# Patient Record
Sex: FEMALE | URBAN - METROPOLITAN AREA
[De-identification: names, ages, dates, MRNs, and addresses within clinical notes are randomized per-mention and may not be internally consistent; named-entity substitution may affect disease eponyms.]

---

## 2023-07-25 ENCOUNTER — TELEPHONE (OUTPATIENT)
Dept: ORTHOPEDIC SURGERY | Facility: CLINIC | Age: 83
End: 2023-07-25

## 2023-07-25 NOTE — TELEPHONE ENCOUNTER
I left message for patient to call me directly so I can schedule her an appt with Dr Aragon this week if possible. The referral is for Dr Huy Dudley's office.  Please send patient to me.    Yasmany

## 2023-08-01 ENCOUNTER — TELEPHONE (OUTPATIENT)
Dept: ORTHOPEDIC SURGERY | Facility: CLINIC | Age: 83
End: 2023-08-01

## 2023-08-08 ENCOUNTER — TELEPHONE (OUTPATIENT)
Dept: ORTHOPEDIC SURGERY | Facility: CLINIC | Age: 83
End: 2023-08-08

## 2023-08-08 NOTE — TELEPHONE ENCOUNTER
Attempted to reach Ms. Zamora again but unsuccessful.  Phone won't allow me to leave a voice message.  This is the 3rd attempt.  If Ms. Zamora should need assistance she can contact our office.  At this time unable to contact after 3 attempts.    Kim

## 2024-06-10 ENCOUNTER — TELEPHONE (OUTPATIENT)
Age: 84
End: 2024-06-10

## 2024-06-10 RX ORDER — METHOTREXATE 2.5 MG/1
25 TABLET ORAL WEEKLY
Qty: 40 TABLET | Refills: 3 | Status: SHIPPED | OUTPATIENT
Start: 2024-06-10

## 2024-06-10 RX ORDER — METHOTREXATE 2.5 MG/1
25 TABLET ORAL WEEKLY
COMMUNITY
End: 2024-06-10 | Stop reason: SDUPTHER

## 2024-07-24 PROBLEM — M81.0 OSTEOPOROSIS: Status: ACTIVE | Noted: 2024-07-24

## 2024-07-24 PROBLEM — M06.9 RHEUMATOID ARTHRITIS: Status: ACTIVE | Noted: 2024-07-24

## 2024-07-25 ENCOUNTER — LAB (OUTPATIENT)
Facility: HOSPITAL | Age: 84
End: 2024-07-25
Payer: MEDICARE

## 2024-07-25 ENCOUNTER — OFFICE VISIT (OUTPATIENT)
Age: 84
End: 2024-07-25
Payer: MEDICARE

## 2024-07-25 ENCOUNTER — TELEPHONE (OUTPATIENT)
Age: 84
End: 2024-07-25

## 2024-07-25 ENCOUNTER — APPOINTMENT (OUTPATIENT)
Facility: HOSPITAL | Age: 84
End: 2024-07-25
Payer: MEDICARE

## 2024-07-25 VITALS
HEIGHT: 64 IN | HEART RATE: 113 BPM | SYSTOLIC BLOOD PRESSURE: 120 MMHG | TEMPERATURE: 98.5 F | DIASTOLIC BLOOD PRESSURE: 82 MMHG | WEIGHT: 103 LBS | BODY MASS INDEX: 17.58 KG/M2

## 2024-07-25 DIAGNOSIS — M81.0 AGE-RELATED OSTEOPOROSIS WITHOUT CURRENT PATHOLOGICAL FRACTURE: Chronic | ICD-10-CM

## 2024-07-25 DIAGNOSIS — M05.9 SEROPOSITIVE RHEUMATOID ARTHRITIS: Chronic | ICD-10-CM

## 2024-07-25 DIAGNOSIS — M05.9 SEROPOSITIVE RHEUMATOID ARTHRITIS: Primary | Chronic | ICD-10-CM

## 2024-07-25 DIAGNOSIS — Z79.899 HIGH RISK MEDICATION USE: Chronic | ICD-10-CM

## 2024-07-25 LAB
ALBUMIN SERPL-MCNC: 3.6 G/DL (ref 3.5–5.2)
ALBUMIN/GLOB SERPL: 1 G/DL
ALP SERPL-CCNC: 75 U/L (ref 39–117)
ALT SERPL W P-5'-P-CCNC: 11 U/L (ref 1–33)
ANION GAP SERPL CALCULATED.3IONS-SCNC: 7.5 MMOL/L (ref 5–15)
AST SERPL-CCNC: 23 U/L (ref 1–32)
BASOPHILS # BLD AUTO: 0.08 10*3/MM3 (ref 0–0.2)
BASOPHILS NFR BLD AUTO: 1.1 % (ref 0–1.5)
BILIRUB SERPL-MCNC: 0.6 MG/DL (ref 0–1.2)
BUN SERPL-MCNC: 15 MG/DL (ref 8–23)
BUN/CREAT SERPL: 15.8 (ref 7–25)
CALCIUM SPEC-SCNC: 9.6 MG/DL (ref 8.6–10.5)
CHLORIDE SERPL-SCNC: 104 MMOL/L (ref 98–107)
CO2 SERPL-SCNC: 29.5 MMOL/L (ref 22–29)
CREAT SERPL-MCNC: 0.95 MG/DL (ref 0.57–1)
CRP SERPL-MCNC: 0.63 MG/DL (ref 0–0.5)
DEPRECATED RDW RBC AUTO: 49.1 FL (ref 37–54)
EGFRCR SERPLBLD CKD-EPI 2021: 60.3 ML/MIN/1.73
EOSINOPHIL # BLD AUTO: 0.06 10*3/MM3 (ref 0–0.4)
EOSINOPHIL NFR BLD AUTO: 0.8 % (ref 0.3–6.2)
ERYTHROCYTE [DISTWIDTH] IN BLOOD BY AUTOMATED COUNT: 14.2 % (ref 12.3–15.4)
ERYTHROCYTE [SEDIMENTATION RATE] IN BLOOD: 31 MM/HR (ref 0–30)
GLOBULIN UR ELPH-MCNC: 3.5 GM/DL
GLUCOSE SERPL-MCNC: 83 MG/DL (ref 65–99)
HCT VFR BLD AUTO: 36.7 % (ref 34–46.6)
HGB BLD-MCNC: 12.5 G/DL (ref 12–15.9)
IMM GRANULOCYTES # BLD AUTO: 0.02 10*3/MM3 (ref 0–0.05)
IMM GRANULOCYTES NFR BLD AUTO: 0.3 % (ref 0–0.5)
LYMPHOCYTES # BLD AUTO: 1.49 10*3/MM3 (ref 0.7–3.1)
LYMPHOCYTES NFR BLD AUTO: 20.1 % (ref 19.6–45.3)
MCH RBC QN AUTO: 32.8 PG (ref 26.6–33)
MCHC RBC AUTO-ENTMCNC: 34.1 G/DL (ref 31.5–35.7)
MCV RBC AUTO: 96.3 FL (ref 79–97)
MONOCYTES # BLD AUTO: 0.73 10*3/MM3 (ref 0.1–0.9)
MONOCYTES NFR BLD AUTO: 9.8 % (ref 5–12)
NEUTROPHILS NFR BLD AUTO: 5.05 10*3/MM3 (ref 1.7–7)
NEUTROPHILS NFR BLD AUTO: 67.9 % (ref 42.7–76)
NRBC BLD AUTO-RTO: 0 /100 WBC (ref 0–0.2)
PLATELET # BLD AUTO: 366 10*3/MM3 (ref 140–450)
PMV BLD AUTO: 10.2 FL (ref 6–12)
POTASSIUM SERPL-SCNC: 4.1 MMOL/L (ref 3.5–5.2)
PROT SERPL-MCNC: 7.1 G/DL (ref 6–8.5)
RBC # BLD AUTO: 3.81 10*6/MM3 (ref 3.77–5.28)
SODIUM SERPL-SCNC: 141 MMOL/L (ref 136–145)
WBC NRBC COR # BLD AUTO: 7.43 10*3/MM3 (ref 3.4–10.8)

## 2024-07-25 PROCEDURE — 36415 COLL VENOUS BLD VENIPUNCTURE: CPT

## 2024-07-25 PROCEDURE — 80053 COMPREHEN METABOLIC PANEL: CPT

## 2024-07-25 PROCEDURE — 86140 C-REACTIVE PROTEIN: CPT

## 2024-07-25 PROCEDURE — 85652 RBC SED RATE AUTOMATED: CPT

## 2024-07-25 PROCEDURE — 85025 COMPLETE CBC W/AUTO DIFF WBC: CPT

## 2024-07-25 RX ORDER — FOLIC ACID 1 MG/1
1 TABLET ORAL DAILY
Qty: 90 TABLET | Refills: 3 | Status: SHIPPED | OUTPATIENT
Start: 2024-07-25

## 2024-07-25 RX ORDER — METHOTREXATE 2.5 MG/1
25 TABLET ORAL WEEKLY
Qty: 40 TABLET | Refills: 3 | Status: SHIPPED | OUTPATIENT
Start: 2024-07-25

## 2024-07-25 NOTE — ASSESSMENT & PLAN NOTE
MTX 25 mg PO once/week for RA  1. CBC and CMP every 8-12 weeks to monitor for medication toxicity.   2. Take folate supplements daily.  3. No recent serious infections.  4. Refill today

## 2024-07-25 NOTE — ASSESSMENT & PLAN NOTE
Diagnosed in 2011; On MTX since 2011; nodular; erosive disease   In the past she was on salsalate  1. Continue methotrexate and folate supplements   2. She is not on prednisone.   3. Monitor labs every 8-12 weeks for toxicity.   4. Prognosis is fair.   5. Her morning stiffness is < 1 hour/day.  6. Refill medicines  7. RTC 4 months

## 2024-07-25 NOTE — ASSESSMENT & PLAN NOTE
* Fosamax started 7/2014 - stopped due to long term use  * DXA 10/26/22: Right femoral neck -3.4 (-13.3% vs previous DEXA in 2020). Spine -2.7  1. Calcium and vitamin D supplements should be taken unless otherwise contraindicated.   2. DXA scan should be monitored approximately every 2 years. Due 10/2024.  3. Prolia was too expensive for her.   4. She got Reclast 10/8/2019, 10/16/20, 10/20/21, 12/6/22. This is given once/year. We will plan/schedule the next dosing. Risks and benefits reviewed  5. Check labs today

## 2024-07-25 NOTE — PROGRESS NOTES
Office Follow Up      Date: 07/25/2024   Patient Name: Paris Zamora  MRN: [id number]  YOB: 1940    Referring Physician: No ref. provider found     Chief Complaint   Patient presents with    Rheumatoid Arthritis     Follow up    Osteoporosis     Follow up       History of Present Illness: Paris Zamora is a 83 y.o. female who is here today for follow up.       She has nodular/erosive RA and is on methotrexate. She remains off of prednisone. She is tolerating her medications well. No recent infections. She does have rheumatoid nodules as well as chronic deformities related to her arthritis.     DXA scan (1/3/2018) consistent with osteoporosis.  She was previously treated with alendronate for osteoporosis. Prolia was started 2/2018 but later stopped due to cost. She got Reclast 10/8/2019, 10/16/20, 10/20/21, and 12/6/22. She has tolerated this well.    Today she rates her pain as 6/10 in severity. She has no significant morning stiffness. No red or hot joints. No swelling at this time. No muscle pain or weakness. No back or neck issues.     No rash. She notes hair loss. Her skin itches. No headaches or paresthesias. No lymphadenopathy. No abnormal bruising/bleeding. No  issues. She notes a loss of appetite. No sicca symptoms. No shortness of breath or chest pain.    Subjective     Review of Systems   Constitutional:  Positive for unexpected weight loss.   HENT:  Positive for hearing loss.    Eyes:  Positive for blurred vision.   Respiratory: Negative.     Cardiovascular: Negative.    Gastrointestinal: Negative.    Endocrine: Negative.    Genitourinary: Negative.    Musculoskeletal:  Positive for arthralgias.   Skin: Negative.    Allergic/Immunologic: Negative.    Neurological: Negative.    Hematological: Negative.    Psychiatric/Behavioral: Negative.  Positive for depressed mood.    All other systems reviewed and are negative.         Current Outpatient Medications:     Calcium  "Carbonate (CALCIUM 500 PO), Take 2 tablets by mouth Daily., Disp: , Rfl:     Cholecalciferol (Vitamin D3) 25 MCG (1000 UT) capsule, Take 1 capsule by mouth Daily., Disp: , Rfl:     folic acid (FOLVITE) 1 MG tablet, Take 1 tablet by mouth Daily., Disp: 90 tablet, Rfl: 3    methotrexate 2.5 MG tablet, Take 10 tablets by mouth 1 (One) Time Per Week., Disp: 40 tablet, Rfl: 3    Omega-3 Fatty Acids (FISH OIL PO), Take 1 capsule by mouth. EVERY 2 DAYS FOR 30 DAYS, Disp: , Rfl:     zoledronic acid (RECLAST) 5 MG/100ML solution infusion, Infuse 100 mL into a venous catheter 1 (One) Time. AS DIRECTED  ONCE ANNUALLY, Disp: , Rfl:     Allergies   Allergen Reactions    Naproxen Provider Review Needed    Prednisone Provider Review Needed       I have reviewed and updated the patient's chief complaint, history of present illness, review of systems, past medical history, surgical history, family history, social history, medications and allergy list as appropriate.     Objective      Vitals:    07/25/24 1341   BP: 120/82   BP Location: Left arm   Pulse: 113   Temp: 98.5 °F (36.9 °C)   Weight: 46.7 kg (103 lb)   Height: 162.6 cm (64\")   PainSc:   6     Body mass index is 17.68 kg/m².      Physical Exam     General: 83 year old  female, not in distress. She is frail in appearance. She walks unassisted with a shuffling gait.  Skin: No rash. She has rheumatoid nodules over each elbow. The left is larger than the right. She has nodules on her hands/fingers as well.  Head/Neck: NCAT, neck is supple, no salivary gland enlargement   Eyes: Conjunctiva and sclera are clear, no photophobia   ENMT: Hearing is globally reduced. No oral or nasal ulcers. She is very hoarse.  Lung exam: Clear throughout all lung fields. No wheezing. She becomes winded with speaking.  CV: Regular rate and rhythm. She has a III/VI NOE.   Peripheral vascular: No edema, no cyanosis.  Psych: Normal mood and affect. The patient is oriented to person, place, and " time   MSK: Chronic deformities of the hands and feet. With regards to her hands she has MCP subluxation with ulnar deviation. She has slight flexion contractures of  both elbows. There is fluid in the left olecranon bursa. She has knee crepitus. 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 No tenderness to palpation. None of the joints are warm to touch.   Lymph: No cervical lymphadenopathy      Procedures    Assessment / Plan      Assessment & Plan  Seropositive rheumatoid arthritis   Diagnosed in 2011; On MTX since 2011; nodular; erosive disease   In the past she was on salsalate  1. Continue methotrexate and folate supplements   2. She is not on prednisone.   3. Monitor labs every 8-12 weeks for toxicity.   4. Prognosis is fair.   5. Her morning stiffness is < 1 hour/day.  6. Refill medicines  7. RTC 4 months   High risk medication use  MTX 25 mg PO once/week for RA  1. CBC and CMP every 8-12 weeks to monitor for medication toxicity.   2. Take folate supplements daily.  3. No recent serious infections.  4. Refill today    Age-related osteoporosis without current pathological fracture  * Fosamax started 7/2014 - stopped due to long term use  * DXA 10/26/22: Right femoral neck -3.4 (-13.3% vs previous DEXA in 2020). Spine -2.7  1. Calcium and vitamin D supplements should be taken unless otherwise contraindicated.   2. DXA scan should be monitored approximately every 2 years. Due 10/2024.  3. Prolia was too expensive for her.   4. She got Reclast 10/8/2019, 10/16/20, 10/20/21, 12/6/22. This is given once/year. We will plan/schedule the next dosing. Risks and benefits reviewed  5. Check labs today        New Medications Ordered This Visit   Medications    methotrexate 2.5 MG tablet     Sig: Take 10 tablets by mouth 1 (One) Time Per Week.     Dispense:  40 tablet     Refill:  3    folic acid (FOLVITE) 1 MG tablet     Sig: Take 1 tablet by mouth Daily.      Dispense:  90 tablet     Refill:  3         Follow Up:   Return in about 4 months (around 11/25/2024).      Huy Dudley DO  [facility] [place]

## 2024-07-25 NOTE — TELEPHONE ENCOUNTER
----- Message from Huy Dudley sent at 7/25/2024  1:58 PM EDT -----  Regarding: Reclast  I am not sure she get Reclast in 2023. She does not seem sure either. Can you check on this and schedule if she is due ?

## 2024-08-01 NOTE — TELEPHONE ENCOUNTER
It looks like last Reclast was 12/6/22. I tried to call pt to see which [facility] location would be easiest for her to get set up for treatment at. I left a message on her/POA's line with my direct line requesting a call back.

## 2024-12-20 ENCOUNTER — OFFICE VISIT (OUTPATIENT)
Age: 84
End: 2024-12-20
Payer: MEDICARE

## 2024-12-20 ENCOUNTER — LAB (OUTPATIENT)
Facility: HOSPITAL | Age: 84
End: 2024-12-20
Payer: MEDICARE

## 2024-12-20 VITALS
BODY MASS INDEX: 18.1 KG/M2 | HEART RATE: 73 BPM | HEIGHT: 64 IN | DIASTOLIC BLOOD PRESSURE: 70 MMHG | WEIGHT: 106 LBS | TEMPERATURE: 98 F | SYSTOLIC BLOOD PRESSURE: 130 MMHG

## 2024-12-20 DIAGNOSIS — Z79.899 HIGH RISK MEDICATION USE: Chronic | ICD-10-CM

## 2024-12-20 DIAGNOSIS — M81.0 AGE-RELATED OSTEOPOROSIS WITHOUT CURRENT PATHOLOGICAL FRACTURE: Chronic | ICD-10-CM

## 2024-12-20 DIAGNOSIS — M05.9 SEROPOSITIVE RHEUMATOID ARTHRITIS: Chronic | ICD-10-CM

## 2024-12-20 DIAGNOSIS — M05.9 SEROPOSITIVE RHEUMATOID ARTHRITIS: Primary | Chronic | ICD-10-CM

## 2024-12-20 LAB
25(OH)D3 SERPL-MCNC: 74.1 NG/ML (ref 30–100)
ALBUMIN SERPL-MCNC: 3.4 G/DL (ref 3.5–5.2)
ALBUMIN/GLOB SERPL: 0.9 G/DL
ALP SERPL-CCNC: 73 U/L (ref 39–117)
ALT SERPL W P-5'-P-CCNC: 17 U/L (ref 1–33)
ANION GAP SERPL CALCULATED.3IONS-SCNC: 9 MMOL/L (ref 5–15)
AST SERPL-CCNC: 27 U/L (ref 1–32)
BASOPHILS # BLD AUTO: 0.12 10*3/MM3 (ref 0–0.2)
BASOPHILS NFR BLD AUTO: 1.3 % (ref 0–1.5)
BILIRUB SERPL-MCNC: 0.3 MG/DL (ref 0–1.2)
BUN SERPL-MCNC: 17 MG/DL (ref 8–23)
BUN/CREAT SERPL: 21.8 (ref 7–25)
CALCIUM SPEC-SCNC: 8.8 MG/DL (ref 8.6–10.5)
CHLORIDE SERPL-SCNC: 104 MMOL/L (ref 98–107)
CO2 SERPL-SCNC: 27 MMOL/L (ref 22–29)
CREAT SERPL-MCNC: 0.78 MG/DL (ref 0.57–1)
CRP SERPL-MCNC: 1.28 MG/DL (ref 0–0.5)
DEPRECATED RDW RBC AUTO: 46.4 FL (ref 37–54)
EGFRCR SERPLBLD CKD-EPI 2021: 75.9 ML/MIN/1.73
EOSINOPHIL # BLD AUTO: 0.04 10*3/MM3 (ref 0–0.4)
EOSINOPHIL NFR BLD AUTO: 0.4 % (ref 0.3–6.2)
ERYTHROCYTE [DISTWIDTH] IN BLOOD BY AUTOMATED COUNT: 13.5 % (ref 12.3–15.4)
ERYTHROCYTE [SEDIMENTATION RATE] IN BLOOD: 49 MM/HR (ref 0–30)
GLOBULIN UR ELPH-MCNC: 3.8 GM/DL
GLUCOSE SERPL-MCNC: 144 MG/DL (ref 65–99)
HCT VFR BLD AUTO: 37.2 % (ref 34–46.6)
HGB BLD-MCNC: 12.9 G/DL (ref 12–15.9)
IMM GRANULOCYTES # BLD AUTO: 0.02 10*3/MM3 (ref 0–0.05)
IMM GRANULOCYTES NFR BLD AUTO: 0.2 % (ref 0–0.5)
LYMPHOCYTES # BLD AUTO: 1.75 10*3/MM3 (ref 0.7–3.1)
LYMPHOCYTES NFR BLD AUTO: 18.4 % (ref 19.6–45.3)
MCH RBC QN AUTO: 33.2 PG (ref 26.6–33)
MCHC RBC AUTO-ENTMCNC: 34.7 G/DL (ref 31.5–35.7)
MCV RBC AUTO: 95.6 FL (ref 79–97)
MONOCYTES # BLD AUTO: 0.52 10*3/MM3 (ref 0.1–0.9)
MONOCYTES NFR BLD AUTO: 5.5 % (ref 5–12)
NEUTROPHILS NFR BLD AUTO: 7.07 10*3/MM3 (ref 1.7–7)
NEUTROPHILS NFR BLD AUTO: 74.2 % (ref 42.7–76)
NRBC BLD AUTO-RTO: 0 /100 WBC (ref 0–0.2)
PLATELET # BLD AUTO: 475 10*3/MM3 (ref 140–450)
PMV BLD AUTO: 10.3 FL (ref 6–12)
POTASSIUM SERPL-SCNC: 4.3 MMOL/L (ref 3.5–5.2)
PROT SERPL-MCNC: 7.2 G/DL (ref 6–8.5)
RBC # BLD AUTO: 3.89 10*6/MM3 (ref 3.77–5.28)
SODIUM SERPL-SCNC: 140 MMOL/L (ref 136–145)
WBC NRBC COR # BLD AUTO: 9.52 10*3/MM3 (ref 3.4–10.8)

## 2024-12-20 PROCEDURE — 85652 RBC SED RATE AUTOMATED: CPT

## 2024-12-20 PROCEDURE — 36415 COLL VENOUS BLD VENIPUNCTURE: CPT

## 2024-12-20 PROCEDURE — 82306 VITAMIN D 25 HYDROXY: CPT

## 2024-12-20 PROCEDURE — 99214 OFFICE O/P EST MOD 30 MIN: CPT | Performed by: INTERNAL MEDICINE

## 2024-12-20 PROCEDURE — 85025 COMPLETE CBC W/AUTO DIFF WBC: CPT

## 2024-12-20 PROCEDURE — 86140 C-REACTIVE PROTEIN: CPT

## 2024-12-20 PROCEDURE — 80053 COMPREHEN METABOLIC PANEL: CPT

## 2024-12-20 RX ORDER — FOLIC ACID 1 MG/1
1 TABLET ORAL DAILY
Qty: 90 TABLET | Refills: 3 | Status: SHIPPED | OUTPATIENT
Start: 2024-12-20

## 2024-12-20 RX ORDER — METHOTREXATE 2.5 MG/1
25 TABLET ORAL WEEKLY
Qty: 40 TABLET | Refills: 3 | Status: SHIPPED | OUTPATIENT
Start: 2024-12-20

## 2024-12-20 NOTE — ASSESSMENT & PLAN NOTE
* Fosamax started 7/2014 - stopped due to long term use  * DXA 10/26/22: Right femoral neck -3.4 (-13.3% vs previous DEXA in 2020). Spine -2.7  1. Calcium and vitamin D supplements should be taken unless otherwise contraindicated.   2. DXA scan should be monitored approximately every 2 years. Due 10/2024.  3. Prolia was too expensive for her.   4. She got Reclast 10/8/2019, 10/16/20, 10/20/21, 12/6/22.   5. Check labs today

## 2024-12-20 NOTE — ASSESSMENT & PLAN NOTE
Diagnosed in 2011; On MTX since 2011; nodular; erosive disease   In the past she was on salsalate    1. Continue methotrexate and folate supplements   2. She is not on prednisone.   3. Monitor labs every 8-12 weeks for toxicity.   4. Prognosis is fair.   5. Her morning stiffness is < 1 hour/day.  6. Refill medicines  7. RTC 4 months   8. We gave her a handout on RA to take home and review.

## 2024-12-20 NOTE — PROGRESS NOTES
Office Follow Up      Date: 12/20/2024   Patient Name: Paris Zamora  MRN: [id number]  YOB: 1940    Referring Physician: Cori Ireland APRN     Chief Complaint   Patient presents with    Rheumatoid Arthritis     Follow up    Osteoporosis     Follow up        History of Present Illness: Paris Zamora is a 84 y.o. female who is here today for follow up.       She has nodular/erosive RA and is on methotrexate. She remains off of prednisone. She is tolerating her medications well. No recent infections. She does have rheumatoid nodules as well as chronic deformities related to her arthritis.     DXA scan (1/3/2018) consistent with osteoporosis.  She was previously treated with alendronate for osteoporosis. Prolia was started 2/2018 but later stopped due to cost. She got Reclast 10/8/2019, 10/16/20, 10/20/21, and 12/6/22. She did not get a dose in 2023 and she has not gotten a dose in 2024.     Today she rates her pain as 6-7/10 in severity. She has no significant morning stiffness. No red or hot joints. No swelling at this time. No muscle pain or weakness. No back or neck issues.     No rash. No headaches or paresthesias. No lymphadenopathy. No abnormal bruising/bleeding. No GI or  issues.  No sicca symptoms. No shortness of breath or chest pain.    Subjective     Review of Systems   HENT:  Positive for hearing loss.    Respiratory: Negative.     Cardiovascular: Negative.    Gastrointestinal: Negative.    Endocrine: Negative.    Genitourinary: Negative.    Musculoskeletal:  Positive for arthralgias.   Skin: Negative.    Allergic/Immunologic: Negative.    Neurological: Negative.    Hematological: Negative.    Psychiatric/Behavioral: Negative.     All other systems reviewed and are negative.         Current Outpatient Medications:     Calcium Carbonate (CALCIUM 500 PO), Take 2 tablets by mouth Daily., Disp: , Rfl:     Cholecalciferol (Vitamin D3) 25 MCG (1000 UT) capsule, Take  "1 capsule by mouth Daily., Disp: , Rfl:     folic acid (FOLVITE) 1 MG tablet, Take 1 tablet by mouth Daily., Disp: 90 tablet, Rfl: 3    methotrexate 2.5 MG tablet, Take 10 tablets by mouth 1 (One) Time Per Week., Disp: 40 tablet, Rfl: 3    Omega-3 Fatty Acids (FISH OIL PO), Take 1 capsule by mouth. EVERY 2 DAYS FOR 30 DAYS, Disp: , Rfl:     zoledronic acid (RECLAST) 5 MG/100ML solution infusion, Infuse 100 mL into a venous catheter 1 (One) Time. AS DIRECTED  ONCE ANNUALLY, Disp: , Rfl:     Allergies   Allergen Reactions    Naproxen Provider Review Needed    Prednisone Provider Review Needed       I have reviewed and updated the patient's chief complaint, history of present illness, review of systems, past medical history, surgical history, family history, social history, medications and allergy list as appropriate.     Objective      Vitals:    12/20/24 0958   BP: 130/70   BP Location: Left arm   Pulse: 73   Temp: 98 °F (36.7 °C)   Weight: 48.1 kg (106 lb)   Height: 162.6 cm (64\")   PainSc:   6   PainLoc: Arm       Body mass index is 18.19 kg/m².      Physical Exam     General: 84 year old  female, not in distress. She is frail in appearance. She walks unassisted with a shuffling gait.  Skin: No rash. She has rheumatoid nodules over each elbow. The left is larger than the right. She has nodules on her hands/fingers as well.  Head/Neck: NCAT, neck is supple, no salivary gland enlargement   Eyes: Conjunctiva and sclera are clear, no photophobia   ENMT: Hearing is globally reduced. No oral or nasal ulcers. She is very hoarse.  Lung exam: Clear throughout all lung fields. No wheezing. She becomes winded with speaking.  CV: Regular rate and rhythm. She has a III/VI NOE.   Peripheral vascular: No edema, no cyanosis.  Psych: Normal mood and affect. The patient is oriented to person, place, and time   MSK: Chronic deformities of the hands and feet. With regards to her hands she has MCP subluxation with ulnar " deviation. She has slight flexion contractures of  both elbows. There is fluid in the left olecranon bursa. She has knee crepitus. 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 No tenderness to palpation. None of the joints are warm to touch.   Lymph: No cervical lymphadenopathy      Procedures    Assessment / Plan      Assessment & Plan  Seropositive rheumatoid arthritis   Diagnosed in 2011; On MTX since 2011; nodular; erosive disease   In the past she was on salsalate    1. Continue methotrexate and folate supplements   2. She is not on prednisone.   3. Monitor labs every 8-12 weeks for toxicity.   4. Prognosis is fair.   5. Her morning stiffness is < 1 hour/day.  6. Refill medicines  7. RTC 4 months   8. We gave her a handout on RA to take home and review.   High risk medication use  MTX 25 mg PO once/week for RA  1. CBC and CMP every 8-12 weeks to monitor for medication toxicity.   2. Take folate supplements daily.  3. No recent serious infections.  4. Refill today  Age-related osteoporosis without current pathological fracture  * Fosamax started 7/2014 - stopped due to long term use  * DXA 10/26/22: Right femoral neck -3.4 (-13.3% vs previous DEXA in 2020). Spine -2.7  1. Calcium and vitamin D supplements should be taken unless otherwise contraindicated.   2. DXA scan should be monitored approximately every 2 years. Due 10/2024.  3. Prolia was too expensive for her.   4. She got Reclast 10/8/2019, 10/16/20, 10/20/21, 12/6/22.   5. Check labs today         New Medications Ordered This Visit   Medications    folic acid (FOLVITE) 1 MG tablet     Sig: Take 1 tablet by mouth Daily.     Dispense:  90 tablet     Refill:  3    methotrexate 2.5 MG tablet     Sig: Take 10 tablets by mouth 1 (One) Time Per Week.     Dispense:  40 tablet     Refill:  3           Follow Up:   Return in about 4 months (around 4/20/2025).      Huy Dudley, DO  [facility] [place]  [facility]

## 2024-12-23 ENCOUNTER — TELEPHONE (OUTPATIENT)
Age: 84
End: 2024-12-23
Payer: MEDICARE

## 2025-04-18 RX ORDER — METHOTREXATE 2.5 MG/1
TABLET ORAL
Qty: 40 TABLET | Refills: 3 | OUTPATIENT
Start: 2025-04-18

## 2025-05-28 ENCOUNTER — LAB (OUTPATIENT)
Facility: HOSPITAL | Age: 85
End: 2025-05-28
Payer: MEDICARE

## 2025-05-28 ENCOUNTER — OFFICE VISIT (OUTPATIENT)
Age: 85
End: 2025-05-28
Payer: MEDICARE

## 2025-05-28 VITALS
TEMPERATURE: 97.8 F | WEIGHT: 105.3 LBS | HEART RATE: 86 BPM | SYSTOLIC BLOOD PRESSURE: 98 MMHG | HEIGHT: 64 IN | DIASTOLIC BLOOD PRESSURE: 68 MMHG | BODY MASS INDEX: 17.98 KG/M2

## 2025-05-28 DIAGNOSIS — Z79.899 HIGH RISK MEDICATION USE: Chronic | ICD-10-CM

## 2025-05-28 DIAGNOSIS — M05.9 SEROPOSITIVE RHEUMATOID ARTHRITIS: Chronic | ICD-10-CM

## 2025-05-28 DIAGNOSIS — M81.0 AGE-RELATED OSTEOPOROSIS WITHOUT CURRENT PATHOLOGICAL FRACTURE: Chronic | ICD-10-CM

## 2025-05-28 DIAGNOSIS — M05.9 SEROPOSITIVE RHEUMATOID ARTHRITIS: Primary | Chronic | ICD-10-CM

## 2025-05-28 LAB
BASOPHILS # BLD AUTO: 0.06 10*3/MM3 (ref 0–0.2)
BASOPHILS NFR BLD AUTO: 0.2 % (ref 0–1.5)
DEPRECATED RDW RBC AUTO: 46.8 FL (ref 37–54)
EOSINOPHIL # BLD AUTO: 0.01 10*3/MM3 (ref 0–0.4)
EOSINOPHIL NFR BLD AUTO: 0 % (ref 0.3–6.2)
ERYTHROCYTE [DISTWIDTH] IN BLOOD BY AUTOMATED COUNT: 14 % (ref 12.3–15.4)
HCT VFR BLD AUTO: 33.8 % (ref 34–46.6)
HGB BLD-MCNC: 11.5 G/DL (ref 12–15.9)
IMM GRANULOCYTES # BLD AUTO: 0.46 10*3/MM3 (ref 0–0.05)
IMM GRANULOCYTES NFR BLD AUTO: 1.6 % (ref 0–0.5)
LYMPHOCYTES # BLD AUTO: 1.16 10*3/MM3 (ref 0.7–3.1)
LYMPHOCYTES NFR BLD AUTO: 4 % (ref 19.6–45.3)
MCH RBC QN AUTO: 32.2 PG (ref 26.6–33)
MCHC RBC AUTO-ENTMCNC: 34 G/DL (ref 31.5–35.7)
MCV RBC AUTO: 94.7 FL (ref 79–97)
MONOCYTES # BLD AUTO: 2.17 10*3/MM3 (ref 0.1–0.9)
MONOCYTES NFR BLD AUTO: 7.4 % (ref 5–12)
NEUTROPHILS NFR BLD AUTO: 25.29 10*3/MM3 (ref 1.7–7)
NEUTROPHILS NFR BLD AUTO: 86.8 % (ref 42.7–76)
NRBC BLD AUTO-RTO: 0 /100 WBC (ref 0–0.2)
PLATELET # BLD AUTO: 436 10*3/MM3 (ref 140–450)
PMV BLD AUTO: 10.7 FL (ref 6–12)
RBC # BLD AUTO: 3.57 10*6/MM3 (ref 3.77–5.28)
WBC NRBC COR # BLD AUTO: 29.15 10*3/MM3 (ref 3.4–10.8)

## 2025-05-28 PROCEDURE — 85025 COMPLETE CBC W/AUTO DIFF WBC: CPT

## 2025-05-28 PROCEDURE — 99214 OFFICE O/P EST MOD 30 MIN: CPT | Performed by: INTERNAL MEDICINE

## 2025-05-28 PROCEDURE — 80053 COMPREHEN METABOLIC PANEL: CPT

## 2025-05-28 PROCEDURE — 86140 C-REACTIVE PROTEIN: CPT

## 2025-05-28 PROCEDURE — 85652 RBC SED RATE AUTOMATED: CPT

## 2025-05-28 PROCEDURE — 36415 COLL VENOUS BLD VENIPUNCTURE: CPT

## 2025-05-28 PROCEDURE — 82306 VITAMIN D 25 HYDROXY: CPT

## 2025-05-28 RX ORDER — FOLIC ACID 1 MG/1
1 TABLET ORAL DAILY
Qty: 90 TABLET | Refills: 3 | Status: SHIPPED | OUTPATIENT
Start: 2025-05-28

## 2025-05-28 RX ORDER — METHOTREXATE 2.5 MG/1
25 TABLET ORAL WEEKLY
Qty: 40 TABLET | Refills: 3 | Status: SHIPPED | OUTPATIENT
Start: 2025-05-28

## 2025-05-28 NOTE — ASSESSMENT & PLAN NOTE
* Fosamax started 7/2014 - stopped due to long term use  * DXA 10/26/22: Right femoral neck -3.4 (-13.3% vs previous DEXA in 2020). Spine -2.7    1. Calcium and vitamin D supplements should be taken unless otherwise contraindicated.   2. DXA scan should be monitored approximately every 2 years.   Will order today   3. Prolia was too expensive for her.   4. She got Reclast 10/8/2019, 10/16/20, 10/20/21, 12/6/22.   She did not get doses in 2023 or 2024 despite our efforts to score.   She has essentially self imposed a drug holiday   5. Check labs today

## 2025-05-28 NOTE — PROGRESS NOTES
Office Follow Up      Date: 05/28/2025   Patient Name: Paris Zamora  MRN: [id number]  YOB: 1940    Referring Physician: No ref. provider found     Chief Complaint   Patient presents with    Rheumatoid Arthritis     Follow up     Osteoporosis     Follow up        History of Present Illness: Paris Zamora is a 84 y.o. female who is here today for follow up.       She has nodular/erosive RA and is on methotrexate. She remains off of prednisone. She is tolerating her medications well. No recent infections. She does have rheumatoid nodules as well as chronic deformities related to her arthritis.     DXA scan (1/3/2018) consistent with osteoporosis.  She was previously treated with alendronate for osteoporosis. Prolia was started 2/2018 but later stopped due to cost. She got Reclast 10/8/2019, 10/16/20, 10/20/21, and 12/6/22. She did not get a dose in 2023 and she has not gotten a dose in 2024.     Today she rates her pain as 3/10 in severity. She has 1 hour/day of morning stiffness. No red or hot joints. No swelling at this time. No muscle pain or weakness. No back or neck issues.     No rash. No headaches or paresthesias. No lymphadenopathy. No abnormal bruising/bleeding. No GI or  issues.  No sicca symptoms. No shortness of breath or chest pain.    Subjective     Review of Systems   HENT:  Positive for hearing loss.    Respiratory: Negative.     Cardiovascular: Negative.    Gastrointestinal: Negative.    Endocrine: Negative.    Genitourinary: Negative.    Musculoskeletal:  Positive for arthralgias.   Skin: Negative.    Allergic/Immunologic: Negative.    Neurological: Negative.    Hematological: Negative.    Psychiatric/Behavioral: Negative.     All other systems reviewed and are negative.         Current Outpatient Medications:     Calcium Carbonate (CALCIUM 500 PO), Take 2 tablets by mouth Daily., Disp: , Rfl:     Cholecalciferol (Vitamin D3) 25 MCG (1000 UT) capsule, Take 1  "capsule by mouth Daily., Disp: , Rfl:     folic acid (FOLVITE) 1 MG tablet, Take 1 tablet by mouth Daily., Disp: 90 tablet, Rfl: 3    methotrexate 2.5 MG tablet, Take 10 tablets by mouth 1 (One) Time Per Week., Disp: 40 tablet, Rfl: 3    Omega-3 Fatty Acids (FISH OIL PO), Take 1 capsule by mouth. EVERY 2 DAYS FOR 30 DAYS, Disp: , Rfl:     zoledronic acid (RECLAST) 5 MG/100ML solution infusion, Infuse 100 mL into a venous catheter 1 (One) Time. AS DIRECTED  ONCE ANNUALLY, Disp: , Rfl:     Allergies   Allergen Reactions    Naproxen Provider Review Needed    Prednisone Provider Review Needed       I have reviewed and updated the patient's chief complaint, history of present illness, review of systems, past medical history, surgical history, family history, social history, medications and allergy list as appropriate.     Objective      Vitals:    05/28/25 1214   BP: 98/68   BP Location: Right arm   Pulse: 86   Temp: 97.8 °F (36.6 °C)   Weight: 47.8 kg (105 lb 4.8 oz)   Height: 162.6 cm (64\")   PainSc: 0-No pain         Body mass index is 18.07 kg/m².      Physical Exam     General: 84 year old  female, not in distress. She is frail in appearance. She walks unassisted with a shuffling gait.  Skin: No rash. She has rheumatoid nodules over each elbow. The left is larger than the right. She has nodules on her hands/fingers as well.  Head/Neck: NCAT, neck is supple, no salivary gland enlargement   Eyes: Conjunctiva and sclera are clear, no photophobia   ENMT: Hearing is globally reduced. No oral or nasal ulcers. She is very hoarse.  Lung exam: Clear throughout all lung fields. No wheezing. She becomes winded with speaking.  CV: Regular rate and rhythm. She has a III/VI NOE.   Peripheral vascular: No edema, no cyanosis.  Psych: Normal mood and affect. The patient is oriented to person, place, and time   MSK: Chronic deformities of the hands and feet. With regards to her hands she has MCP subluxation with ulnar " deviation. She has slight flexion contractures of  both elbows. There is fluid in the left olecranon bursa. She has knee crepitus. 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 No tenderness to palpation. None of the joints are warm to touch.   Lymph: No cervical lymphadenopathy      Procedures    Assessment / Plan      Assessment & Plan  Seropositive rheumatoid arthritis   Diagnosed in 2011; On MTX since 2011; nodular; erosive disease   In the past she was on salsalate    1. Continue methotrexate and folate supplements   2. She is not on prednisone.   3. Monitor labs every 8-12 weeks for toxicity.   4. Prognosis is fair.   5. Her morning stiffness is < 1 hour/day.  6. Refill medicines  7. RTC 4 months   8. We gave her a handout on OA to take home and review.   High risk medication use  MTX 25 mg PO once/week for RA  1. CBC and CMP every 8-12 weeks to monitor for medication toxicity.   2. Take folate supplements daily.  3. No recent serious infections.  4. Refill today  Age-related osteoporosis without current pathological fracture  * Fosamax started 7/2014 - stopped due to long term use  * DXA 10/26/22: Right femoral neck -3.4 (-13.3% vs previous DEXA in 2020). Spine -2.7    1. Calcium and vitamin D supplements should be taken unless otherwise contraindicated.   2. DXA scan should be monitored approximately every 2 years.   Will order today   3. Prolia was too expensive for her.   4. She got Reclast 10/8/2019, 10/16/20, 10/20/21, 12/6/22.   She did not get doses in 2023 or 2024 despite our efforts to score.   She has essentially self imposed a drug holiday   5. Check labs today      New Medications Ordered This Visit   Medications    folic acid (FOLVITE) 1 MG tablet     Sig: Take 1 tablet by mouth Daily.     Dispense:  90 tablet     Refill:  3    methotrexate 2.5 MG tablet     Sig: Take 10 tablets by mouth 1 (One) Time Per Week.     Dispense:  40 tablet      Refill:  3             Follow Up:   Return in about 4 months (around 9/28/2025).      Huy Dudley DO  [facility] [place] [facility] [place]

## 2025-05-28 NOTE — ASSESSMENT & PLAN NOTE
Diagnosed in 2011; On MTX since 2011; nodular; erosive disease   In the past she was on salsalate    1. Continue methotrexate and folate supplements   2. She is not on prednisone.   3. Monitor labs every 8-12 weeks for toxicity.   4. Prognosis is fair.   5. Her morning stiffness is < 1 hour/day.  6. Refill medicines  7. RTC 4 months   8. We gave her a handout on OA to take home and review.

## 2025-05-29 DIAGNOSIS — D72.829 LEUKOCYTOSIS, UNSPECIFIED TYPE: ICD-10-CM

## 2025-05-29 DIAGNOSIS — D64.9 ANEMIA, UNSPECIFIED TYPE: Primary | ICD-10-CM

## 2025-05-29 LAB
25(OH)D3 SERPL-MCNC: 105 NG/ML (ref 30–100)
ALBUMIN SERPL-MCNC: 2.8 G/DL (ref 3.5–5.2)
ALBUMIN/GLOB SERPL: 0.8 G/DL
ALP SERPL-CCNC: 102 U/L (ref 39–117)
ALT SERPL W P-5'-P-CCNC: 23 U/L (ref 1–33)
ANION GAP SERPL CALCULATED.3IONS-SCNC: 7.9 MMOL/L (ref 5–15)
AST SERPL-CCNC: 29 U/L (ref 1–32)
BILIRUB SERPL-MCNC: 1.1 MG/DL (ref 0–1.2)
BUN SERPL-MCNC: 18 MG/DL (ref 8–23)
BUN/CREAT SERPL: 17.6 (ref 7–25)
CALCIUM SPEC-SCNC: 8.9 MG/DL (ref 8.6–10.5)
CHLORIDE SERPL-SCNC: 101 MMOL/L (ref 98–107)
CO2 SERPL-SCNC: 27.1 MMOL/L (ref 22–29)
CREAT SERPL-MCNC: 1.02 MG/DL (ref 0.57–1)
CRP SERPL-MCNC: 22.87 MG/DL (ref 0–0.5)
EGFRCR SERPLBLD CKD-EPI 2021: 55 ML/MIN/1.73
ERYTHROCYTE [SEDIMENTATION RATE] IN BLOOD: 57 MM/HR (ref 0–30)
GLOBULIN UR ELPH-MCNC: 3.7 GM/DL
GLUCOSE SERPL-MCNC: 113 MG/DL (ref 65–99)
POTASSIUM SERPL-SCNC: 4.4 MMOL/L (ref 3.5–5.2)
PROT SERPL-MCNC: 6.5 G/DL (ref 6–8.5)
SODIUM SERPL-SCNC: 136 MMOL/L (ref 136–145)